# Patient Record
Sex: MALE | Race: WHITE | Employment: FULL TIME | URBAN - METROPOLITAN AREA
[De-identification: names, ages, dates, MRNs, and addresses within clinical notes are randomized per-mention and may not be internally consistent; named-entity substitution may affect disease eponyms.]

---

## 2017-01-17 ENCOUNTER — OFFICE VISIT (OUTPATIENT)
Dept: FAMILY MEDICINE | Facility: CLINIC | Age: 37
End: 2017-01-17

## 2017-01-17 VITALS
TEMPERATURE: 98.3 F | RESPIRATION RATE: 16 BRPM | HEART RATE: 57 BPM | SYSTOLIC BLOOD PRESSURE: 100 MMHG | HEIGHT: 69 IN | BODY MASS INDEX: 30.6 KG/M2 | OXYGEN SATURATION: 98 % | DIASTOLIC BLOOD PRESSURE: 54 MMHG | WEIGHT: 206.6 LBS

## 2017-01-17 DIAGNOSIS — E66.09 OBESITY DUE TO EXCESS CALORIES, UNSPECIFIED OBESITY SEVERITY: ICD-10-CM

## 2017-01-17 DIAGNOSIS — F41.1 GAD (GENERALIZED ANXIETY DISORDER): Primary | ICD-10-CM

## 2017-01-17 DIAGNOSIS — G47.00 PERSISTENT INSOMNIA: ICD-10-CM

## 2017-01-17 PROCEDURE — 99214 OFFICE O/P EST MOD 30 MIN: CPT | Performed by: FAMILY MEDICINE

## 2017-01-17 RX ORDER — PAROXETINE 20 MG/1
20 TABLET, FILM COATED ORAL DAILY
Qty: 90 TABLET | Refills: 3 | Status: SHIPPED | OUTPATIENT
Start: 2017-01-17 | End: 2017-12-14

## 2017-01-17 RX ORDER — TRAZODONE HYDROCHLORIDE 50 MG/1
50 TABLET, FILM COATED ORAL AT BEDTIME
Qty: 90 TABLET | Refills: 3 | Status: SHIPPED | OUTPATIENT
Start: 2017-01-17 | End: 2017-12-14

## 2017-01-17 ASSESSMENT — ANXIETY QUESTIONNAIRES
5. BEING SO RESTLESS THAT IT IS HARD TO SIT STILL: NOT AT ALL
GAD7 TOTAL SCORE: 5
6. BECOMING EASILY ANNOYED OR IRRITABLE: SEVERAL DAYS
IF YOU CHECKED OFF ANY PROBLEMS ON THIS QUESTIONNAIRE, HOW DIFFICULT HAVE THESE PROBLEMS MADE IT FOR YOU TO DO YOUR WORK, TAKE CARE OF THINGS AT HOME, OR GET ALONG WITH OTHER PEOPLE: NOT DIFFICULT AT ALL
1. FEELING NERVOUS, ANXIOUS, OR ON EDGE: SEVERAL DAYS
2. NOT BEING ABLE TO STOP OR CONTROL WORRYING: SEVERAL DAYS
3. WORRYING TOO MUCH ABOUT DIFFERENT THINGS: SEVERAL DAYS
7. FEELING AFRAID AS IF SOMETHING AWFUL MIGHT HAPPEN: NOT AT ALL

## 2017-01-17 ASSESSMENT — PATIENT HEALTH QUESTIONNAIRE - PHQ9: 5. POOR APPETITE OR OVEREATING: SEVERAL DAYS

## 2017-01-17 NOTE — MR AVS SNAPSHOT
After Visit Summary   1/17/2017    Rakesh Marie    MRN: [id number]           Patient Information     Date Of Birth          1980        Visit Information        Provider Department      1/17/2017 4:30 PM Cyn Briones MD [facility]        Today's Diagnoses     JORGE L (generalized anxiety disorder)    -  1     Persistent insomnia         Obesity due to excess calories, unspecified obesity severity           Care Instructions    I've explained to him that drugs of the SSRI class can have side effects such as weight gain, sexual dysfunction, insomnia, headache, nausea. These medications are generally effective at alleviating symptoms of anxiety and/or depression. Let me know if significant side effects do occur.  Refilled one year    A low fat diet, regular aerobic exercise like walking 30 minutes daily and weight control is the treatment recommendations at this time          Follow-ups after your visit        Who to contact     If you have questions or need follow up information about today's clinic visit or your schedule please contact [facility] directly at [phone].  Normal or non-critical lab and imaging results will be communicated to you by Hingehart, letter or phone within 4 business days after the clinic has received the results. If you do not hear from us within 7 days, please contact the clinic through [employer] or phone. If you have a critical or abnormal lab result, we will notify you by phone as soon as possible.  Submit refill requests through [employer] or call your pharmacy and they will forward the refill request to us. Please allow 3 business days for your refill to be completed.          Additional Information About Your Visit        [email or web address] Information     [employer] lets you send messages to your doctor, view your test results, renew your prescriptions, schedule appointments and more. To sign up, go to [email or web address] . Click on  "\"Log in\" on the left side of the screen, which will take you to the Welcome page. Then click on \"Sign up Now\" on the right side of the page.     You will be asked to enter the access code listed below, as well as some personal information. Please follow the directions to create your username and password.     Your access code is: [email or web address]  Expires: 2017  5:21 PM     Your access code will  in 90 days. If you need help or a new code, please call your [place] clinic or [phone].        Care EveryWhere ID     This is your Care EveryWhere ID. This could be used by other organizations to access your [place] medical records  [id number]        Your Vitals Were     Pulse Temperature Height BMI (Body Mass Index) Pulse Oximetry       57 98.3  F (36.8  C) (Oral) 1.74 m (5' 8.5\") 30.95 kg/m2 98%        Blood Pressure from Last 3 Encounters:   17 100/54   16 110/70   03/24/15 118/70    Weight from Last 3 Encounters:   17 93.713 kg (206 lb 9.6 oz)   16 93.895 kg (207 lb)   03/24/15 96.707 kg (213 lb 3.2 oz)              Today, you had the following     No orders found for display         Where to get your medicines      These medications were sent to [facility] [address] [place], [place] [id number]     Phone:  [phone]    - PARoxetine 20 MG tablet  - traZODone 50 MG tablet       Primary Care Provider Office Phone # Fax #    Cyn Briones [phone]       [facility] [address] [address]        Thank you!     Thank you for choosing [facility]  for your care. Our goal is always to provide you with excellent care. Hearing back from our patients is one way we can continue to improve our services. Please take a few minutes to complete the written survey that you may receive in the mail after your visit with us. Thank you!             Your Updated Medication " List - Protect others around you: Learn how to safely use, store and throw away your medicines at www.disposemymeds.org.          This list is accurate as of: 1/17/17  5:21 PM.  Always use your most recent med list.                   Brand Name Dispense Instructions for use    PARoxetine 20 MG tablet    PAXIL    90 tablet    Take 1 tablet (20 mg) by mouth daily       traZODone 50 MG tablet    DESYREL    90 tablet    Take 1 tablet (50 mg) by mouth At Bedtime at bedtime.

## 2017-01-17 NOTE — NURSING NOTE
Rakesh is here for med check    Pre-Visit Screening :  Immunizations : up to date  Colon Screening : NA  Asthma Action Test/Plan : NA  PHQ9/GAD7 :  Done today  BP done on the right arm, with a lg sized cuff.  Pulse - regular  My Chart - declines    CLASSIFICATION OF OVERWEIGHT AND OBESITY BY BMI                         Obesity Class           BMI(kg/m2)  Underweight                                    < 18.5  Normal                                         18.5-24.9  Overweight                                     25.0-29.9  OBESITY                     I                  30.0-34.9                              II                 35.0-39.9  EXTREME OBESITY             III                >40                             Patient's  BMI Body mass index is 30.95 kg/(m^2).  http://hin.nhlbi.nih.gov/menuplanner/menu.cgi  Questioned patient about current smoking habits.  Pt. quit smoking some time ago.

## 2017-01-17 NOTE — PROGRESS NOTES
"SUBJECTIVE:  Rakesh Marie is an 36 year old male who presents for follow-up   of anxiety with panic attacks and mild depressive symptoms.  Initially evaluated age 19 with a panic attack in the ER.  Notes from that visit reviewed.  Current symptoms include   none. Symptoms that have subjectively improved include depressed mood, insomnia, psychomotor agitation (restless and /or feeling on edge), feelings of worthlessness, feelings of guilt, difficulty with concentration, anxiety, irritablility, panic attacks, sleep disturbance, difficulty falling asleep.  Previous and current treatment modalities   employed include individual therapy and medication(s) Paxil (paroxetine) and Trazodone.     Organic causes of depression present: strong family history    Current Outpatient Prescriptions   Medication     traZODone (DESYREL) 50 MG tablet     PARoxetine (PAXIL) 20 MG tablet     [DISCONTINUED] traZODone (DESYREL) 50 MG tablet     [DISCONTINUED] PARoxetine (PAXIL) 20 MG tablet     No current facility-administered medications for this visit.     Allergies   Allergen Reactions     Amoxicillin Diarrhea     Side effects of medication: none    Social History   Substance Use Topics     Smoking status: Former Smoker -- 0.50 packs/day for 17 years     Types: Cigarettes     Quit date: 03/01/2012     Smokeless tobacco: Never Used     Alcohol Use: 3.0 oz/week     6 drink(s) per week      Comment: 6 drinks per week         Neurologic: negative  Psychiatric: excessive stress-work deadlines/ family at home  Endocrine: negative    OBJECTIVE:  /54 mmHg  Pulse 57  Temp(Src) 98.3  F (36.8  C) (Oral)  Ht 1.74 m (5' 8.5\")  Wt 93.713 kg (206 lb 9.6 oz)  BMI 30.95 kg/m2  SpO2 98%  Mental Status Examination  Posture and motor behavior: negative  Dress, grooming, personal hygiene: negative  Facial expression: negative  Speech: negative  Mood: negative  Coherency and relevance of thought: negative  Thought content: negative  Perceptions: " negative  Orientation: negative  Attention and concentration: negative  Memory: : negative  Information: negative  Vocabulary: negative  Abstract reasoning: negative  Judgment: negative    General appearance: healthy, alert, no distress, cooperative, smiling and over weight  Eyes: conjunctivae/corneas clear. PERRL, EOM's intact. Fundi benign  Ears: negative  Oropharynx: Lips, mucosa, and tongue normal. Teeth and gums normal.  Neck: Neck supple. No adenopathy. Thyroid symmetric, normal size,, Carotids without bruits.  Lungs: negative, Percussion normal. Good diaphragmatic excursion. Lungs clear  Heart: negative, PMI normal. No lifts, heaves, or thrills. RRR. No murmurs, clicks gallops or rub  Abdomen: Abdomen soft, non-tender. BS normal. No masses, organomegaly  Neuro: Gait normal. Reflexes normal and symmetric. Sensation grossly WNL.  BMI : Body mass index is 30.95 kg/(m^2).    ASSESSMENT:(F41.1) JORGE L (generalized anxiety disorder)  (primary encounter diagnosis)  Comment: I've explained to him that drugs of the SSRI class can have side effects such as weight gain, sexual dysfunction, insomnia, headache, nausea. These medications are generally effective at alleviating symptoms of anxiety and/or depression. Let me know if significant side effects do occur.    Plan: traZODone (DESYREL) 50 MG tablet, PARoxetine         (PAXIL) 20 MG tablet        Potential medication side effects were discussed with the patient; let me know if any occur.  Refilled one year.    (G47.00) Persistent insomnia  Plan: traZODone (DESYREL) 50 MG tablet        Potential medication side effects were discussed with the patient; let me know if any occur.      (E66.09) Obesity due to excess calories, unspecified obesity severity  Comment: options for strews reduction  Plan: A low fat diet, regular aerobic exercise like walking 30 minutes daily and weight control is the treatment recommendations at this time

## 2017-01-17 NOTE — PATIENT INSTRUCTIONS
I've explained to him that drugs of the SSRI class can have side effects such as weight gain, sexual dysfunction, insomnia, headache, nausea. These medications are generally effective at alleviating symptoms of anxiety and/or depression. Let me know if significant side effects do occur.  Refilled one year    A low fat diet, regular aerobic exercise like walking 30 minutes daily and weight control is the treatment recommendations at this time

## 2017-01-18 ASSESSMENT — ANXIETY QUESTIONNAIRES: GAD7 TOTAL SCORE: 5

## 2017-01-18 ASSESSMENT — PATIENT HEALTH QUESTIONNAIRE - PHQ9: SUM OF ALL RESPONSES TO PHQ QUESTIONS 1-9: 2

## 2017-12-14 ENCOUNTER — OFFICE VISIT (OUTPATIENT)
Dept: FAMILY MEDICINE | Facility: CLINIC | Age: 37
End: 2017-12-14

## 2017-12-14 VITALS
TEMPERATURE: 98 F | RESPIRATION RATE: 16 BRPM | HEART RATE: 80 BPM | BODY MASS INDEX: 31.81 KG/M2 | HEIGHT: 69 IN | OXYGEN SATURATION: 96 % | SYSTOLIC BLOOD PRESSURE: 116 MMHG | DIASTOLIC BLOOD PRESSURE: 60 MMHG | WEIGHT: 214.8 LBS

## 2017-12-14 DIAGNOSIS — G47.00 PERSISTENT INSOMNIA: ICD-10-CM

## 2017-12-14 DIAGNOSIS — F41.1 GAD (GENERALIZED ANXIETY DISORDER): Primary | ICD-10-CM

## 2017-12-14 PROCEDURE — 99214 OFFICE O/P EST MOD 30 MIN: CPT | Performed by: FAMILY MEDICINE

## 2017-12-14 RX ORDER — PAROXETINE 20 MG/1
20 TABLET, FILM COATED ORAL DAILY
Qty: 90 TABLET | Refills: 3 | Status: SHIPPED | OUTPATIENT
Start: 2017-12-14

## 2017-12-14 RX ORDER — TRAZODONE HYDROCHLORIDE 50 MG/1
50 TABLET, FILM COATED ORAL AT BEDTIME
Qty: 90 TABLET | Refills: 3 | Status: SHIPPED | OUTPATIENT
Start: 2017-12-14

## 2017-12-14 ASSESSMENT — PATIENT HEALTH QUESTIONNAIRE - PHQ9
5. POOR APPETITE OR OVEREATING: NOT AT ALL
SUM OF ALL RESPONSES TO PHQ QUESTIONS 1-9: 5

## 2017-12-14 ASSESSMENT — ANXIETY QUESTIONNAIRES
5. BEING SO RESTLESS THAT IT IS HARD TO SIT STILL: NOT AT ALL
2. NOT BEING ABLE TO STOP OR CONTROL WORRYING: SEVERAL DAYS
IF YOU CHECKED OFF ANY PROBLEMS ON THIS QUESTIONNAIRE, HOW DIFFICULT HAVE THESE PROBLEMS MADE IT FOR YOU TO DO YOUR WORK, TAKE CARE OF THINGS AT HOME, OR GET ALONG WITH OTHER PEOPLE: NOT DIFFICULT AT ALL
GAD7 TOTAL SCORE: 2
3. WORRYING TOO MUCH ABOUT DIFFERENT THINGS: NOT AT ALL
6. BECOMING EASILY ANNOYED OR IRRITABLE: NOT AT ALL
7. FEELING AFRAID AS IF SOMETHING AWFUL MIGHT HAPPEN: NOT AT ALL
1. FEELING NERVOUS, ANXIOUS, OR ON EDGE: SEVERAL DAYS

## 2017-12-14 NOTE — PATIENT INSTRUCTIONS
JORGE L (generalized anxiety disorder)  (primary encounter diagnosis)  Plan: traZODone (DESYREL) 50 MG tablet, PARoxetine         (PAXIL) 20 MG tablet        I've explained to him that drugs of the SSRI class can have side effects such as weight gain, sexual dysfunction, insomnia, headache, nausea. These medications are generally effective at alleviating symptoms of anxiety and/or depression. Let me know if significant side effects do occur.

## 2017-12-14 NOTE — NURSING NOTE
Rakesh is here for a medication recheck.     Pre-Visit Screening :  Immunizations : up to date  Colon Screening : NA  Asthma Action Test/Plan : NA  PHQ9/GAD7 :  Done today    Pulse - regular  My Chart - accepts    CLASSIFICATION OF OVERWEIGHT AND OBESITY BY BMI                         Obesity Class           BMI(kg/m2)  Underweight                                    < 18.5  Normal                                         18.5-24.9  Overweight                                     25.0-29.9  OBESITY                     I                  30.0-34.9                              II                 35.0-39.9  EXTREME OBESITY             III                >40                             Patient's  BMI Body mass index is 32.19 kg/(m^2).  http://hin.nhlbi.nih.gov/menuplanner/menu.cgi  Questioned patient about current smoking habits.  Pt. quit smoking some time ago.    The patient has verbalized that it is ok to leave a detailed voice message on the patient's cell phone with results/recommendations from this visit.       Verified cell phone number: *[phone]**    Regla.RANDY Zuleta ([facility])

## 2017-12-14 NOTE — PROGRESS NOTES
"SUBJECTIVE:  Rakesh Marie is an 37 year old male who presents for follow-up of anxiety with panic attacks and mild depressive symptoms.  Initially evaluated age 19 in the ER.  Notes from that visit reviewed.  Current symptoms include   irritablility. Symptoms that have subjectively improved include depressed mood, insomnia, psychomotor agitation (restless and /or feeling on edge), difficulty with concentration, anxiety, irritablility, panic attacks, sleep disturbance, difficulty falling asleep.  Previous and current treatment modalities   employed include individual therapy and medication(s) Paxil (paroxetine) and Trazodone.     Organic causes of depression present: strong family history    Current Outpatient Prescriptions   Medication     traZODone (DESYREL) 50 MG tablet     PARoxetine (PAXIL) 20 MG tablet     No current facility-administered medications for this visit.      Allergies   Allergen Reactions     Amoxicillin Diarrhea     Tree Nuts [Nuts] Rash     Side effects of medication: sometimes sleepy after trazodone/none    Social History   Substance Use Topics     Smoking status: Former Smoker     Packs/day: 0.50     Years: 17.00     Types: Cigarettes     Quit date: 3/1/2012     Smokeless tobacco: Never Used     Alcohol use 3.0 oz/week     6 drink(s) per week      Comment: 6 drinks per week         Neurologic: negative  Psychiatric: excessive stress-new job supervising  Endocrine: negative    OBJECTIVE:  /60 (BP Location: Right arm, Patient Position: Chair, Cuff Size: Adult Large)  Pulse 80  Temp 98  F (36.7  C) (Oral)  Ht 1.74 m (5' 8.5\")  Wt 97.4 kg (214 lb 12.8 oz)  SpO2 96%  BMI 32.19 kg/m2  Mental Status Examination  Posture and motor behavior: negative  Dress, grooming, personal hygiene: negative  Facial expression: negative  Speech: negative  Mood: negative  Coherency and relevance of thought: negative  Thought content: negative  Perceptions: negative  Orientation: negative  Attention and " concentration: negative  Memory: : negative  Information: negative  Vocabulary: negative  Abstract reasoning: negative  Judgment: negative    General appearance: healthy, alert, no distress, cooperative and smiling  Eyes: conjunctivae/corneas clear. PERRL, EOM's intact. Fundi benign  Ears: negative  Oropharynx: Lips, mucosa, and tongue normal. Teeth and gums normal.  Neck: Neck supple. No adenopathy. Thyroid symmetric, normal size,, Carotids without bruits.  Lungs: negative, Percussion normal. Good diaphragmatic excursion. Lungs clear  Heart: negative, PMI normal. No lifts, heaves, or thrills. RRR. No murmurs, clicks gallops or rub  Abdomen: Abdomen soft, non-tender. BS normal. No masses, organomegaly, positive findings: obese  Neuro: Gait normal. Reflexes normal and symmetric. Sensation grossly WNL.    ASSESSMENT:(F41.1) JORGE L (generalized anxiety disorder)  (primary encounter diagnosis)  Plan: traZODone (DESYREL) 50 MG tablet, PARoxetine         (PAXIL) 20 MG tablet        I've explained to him that drugs of the SSRI class can have side effects such as weight gain, sexual dysfunction, insomnia, headache, nausea. These medications are generally effective at alleviating symptoms of anxiety and/or depression. Let me know if significant side effects do occur.  Recheck 1 year    (G47.00) Persistent insomnia  Plan: traZODone (DESYREL) 50 MG tablet        Potential medication side effects were discussed with the patient; let me know if any occur.

## 2017-12-14 NOTE — MR AVS SNAPSHOT
"              After Visit Summary   12/14/2017    Rakesh Marie    MRN: [id number]           Patient Information     Date Of Birth          1980        Visit Information        Provider Department      12/14/2017 1:00 PM Cyn Briones MD [facility]        Today's Diagnoses     JORGE L (generalized anxiety disorder)    -  1    Persistent insomnia          Care Instructions    JORGE L (generalized anxiety disorder)  (primary encounter diagnosis)  Plan: traZODone (DESYREL) 50 MG tablet, PARoxetine         (PAXIL) 20 MG tablet        I've explained to him that drugs of the SSRI class can have side effects such as weight gain, sexual dysfunction, insomnia, headache, nausea. These medications are generally effective at alleviating symptoms of anxiety and/or depression. Let me know if significant side effects do occur.            Follow-ups after your visit        Who to contact     If you have questions or need follow up information about today's clinic visit or your schedule please contact [facility] directly at [phone].  Normal or non-critical lab and imaging results will be communicated to you by netomathart, letter or phone within 4 business days after the clinic has received the results. If you do not hear from us within 7 days, please contact the clinic through Xillian[employer] or phone. If you have a critical or abnormal lab result, we will notify you by phone as soon as possible.  Submit refill requests through [employer] or call your pharmacy and they will forward the refill request to us. Please allow 3 business days for your refill to be completed.          Additional Information About Your Visit        [employer] Information     [employer] lets you send messages to your doctor, view your test results, renew your prescriptions, schedule appointments and more. To sign up, go to [email or web address] . Click on \"Log in\" on the left side of the screen, which will take you to the " "Welcome page. Then click on \"Sign up Now\" on the right side of the page.     You will be asked to enter the access code listed below, as well as some personal information. Please follow the directions to create your username and password.     Your access code is: [email or web address]  Expires: 3/14/2018  2:51 PM     Your access code will  in 90 days. If you need help or a new code, please call your [place] clinic or [phone].        Care EveryWhere ID     This is your Care EveryWhere ID. This could be used by other organizations to access your [facility] medical records  [id number]        Your Vitals Were     Pulse Temperature Height Pulse Oximetry BMI (Body Mass Index)       80 98  F (36.7  C) (Oral) 1.74 m (5' 8.5\") 96% 32.19 kg/m2        Blood Pressure from Last 3 Encounters:   17 116/60   17 100/54   16 110/70    Weight from Last 3 Encounters:   17 97.4 kg (214 lb 12.8 oz)   17 93.7 kg (206 lb 9.6 oz)   16 93.9 kg (207 lb)              Today, you had the following     No orders found for display         Where to get your medicines      These medications were sent to [facility] [address] [place] [address] [employer]  AT [place] [address] [facility] [address]     Phone:  [phone]     PARoxetine 20 MG tablet    traZODone 50 MG tablet          Primary Care Provider Office Phone # Fax #    Cyn Briones [phone]       [facility] [address] [address]        Equal Access to Services     [facility] VIRGEN : Angela Robb, washreyada luqadaha, qaybta kaalmada katia, ankush hammonds. [facility] [phone].    ATENCIÓN: Si habla español, tiene a taveras disposición servicios gratuitos de asistencia lingüística. Llame al [phone].    We comply with applicable federal civil rights laws and [place] laws. We do not discriminate on the basis of race, color, national " origin, age, disability, sex, sexual orientation, or gender identity.            Thank you!     Thank you for choosing [facility]  for your care. Our goal is always to provide you with excellent care. Hearing back from our patients is one way we can continue to improve our services. Please take a few minutes to complete the written survey that you may receive in the mail after your visit with us. Thank you!             Your Updated Medication List - Protect others around you: Learn how to safely use, store and throw away your medicines at www.disposemymeds.org.          This list is accurate as of: 12/14/17  2:51 PM.  Always use your most recent med list.                   Brand Name Dispense Instructions for use Diagnosis    PARoxetine 20 MG tablet    PAXIL    90 tablet    Take 1 tablet (20 mg) by mouth daily    JORGE L (generalized anxiety disorder)       traZODone 50 MG tablet    DESYREL    90 tablet    Take 1 tablet (50 mg) by mouth At Bedtime at bedtime.    Persistent insomnia, JORGE L (generalized anxiety disorder)

## 2017-12-15 ASSESSMENT — ANXIETY QUESTIONNAIRES: GAD7 TOTAL SCORE: 2

## 2022-01-03 ENCOUNTER — OFFICE VISIT (OUTPATIENT)
Dept: FAMILY MEDICINE | Facility: CLINIC | Age: 42
End: 2022-01-03

## 2022-01-03 VITALS
HEART RATE: 74 BPM | DIASTOLIC BLOOD PRESSURE: 74 MMHG | RESPIRATION RATE: 16 BRPM | OXYGEN SATURATION: 98 % | SYSTOLIC BLOOD PRESSURE: 118 MMHG | TEMPERATURE: 98.4 F | BODY MASS INDEX: 32.96 KG/M2 | WEIGHT: 220 LBS

## 2022-01-03 DIAGNOSIS — R05.9 COUGH: Primary | ICD-10-CM

## 2022-01-03 DIAGNOSIS — J06.9 UPPER RESPIRATORY TRACT INFECTION, UNSPECIFIED TYPE: ICD-10-CM

## 2022-01-03 LAB
FLUAV AG SPEC QL IA: NEGATIVE
FLUBV AG SPEC QL IA: NEGATIVE

## 2022-01-03 PROCEDURE — U0005 INFEC AGEN DETEC AMPLI PROBE: HCPCS | Performed by: PHYSICIAN ASSISTANT

## 2022-01-03 PROCEDURE — U0003 INFECTIOUS AGENT DETECTION BY NUCLEIC ACID (DNA OR RNA); SEVERE ACUTE RESPIRATORY SYNDROME CORONAVIRUS 2 (SARS-COV-2) (CORONAVIRUS DISEASE [COVID-19]), AMPLIFIED PROBE TECHNIQUE, MAKING USE OF HIGH THROUGHPUT TECHNOLOGIES AS DESCRIBED BY CMS-2020-01-R: HCPCS | Performed by: PHYSICIAN ASSISTANT

## 2022-01-03 PROCEDURE — 99203 OFFICE O/P NEW LOW 30 MIN: CPT | Performed by: PHYSICIAN ASSISTANT

## 2022-01-03 PROCEDURE — 87804 INFLUENZA ASSAY W/OPTIC: CPT | Performed by: PHYSICIAN ASSISTANT

## 2022-01-03 NOTE — PATIENT INSTRUCTIONS
Patient Education     For Patients Who Have Been Tested for COVID-19 (Coronavirus)  You have been tested for COVID-19 (coronavirus). Results are typically available in 1 to 3 days. Our testing sites do not have access to your test results.  If you have not received your results in 5 days, please do the following:    If you are being tested before surgery: Call your surgeon's office or call [phone] ([phone]) and ask to speak with our COVID-19 results team.    If you are being treated at an infusion center: Call your infusion center directly.    All others: Call [phone] ([phone]) and ask to speak with our COVID-19 results team.  What you should do if you have COVID-19 symptoms  Please stay home and away from others (self-isolate) until:    You've had no fever--and no medicine that reduces fever--for 3 full days (72 hours), AND    Your other symptoms have gotten better. For example, your cough or breathing has improved, AND    At least 7 days have passed since your symptoms first started.  During this time:    Don't go to work, school or anywhere else.    Stay away from others in your home. No hugging, kissing or shaking hands.    Don't let anyone visit.    Cover your mouth and nose with a mask, tissue or washcloth to avoid spreading germs.    Wash your hands and face often. Use soap and water.  When to call for 911 for medical help  If you have any of these emergency warning signs for COVID-19, call for medical help right away:    Trouble breathing    Pain or pressure in the chest all the time    Blue color to your lips or face  These are not all the symptoms you can have with COVID-19. Call your health provider for any other symptoms that are severe or concerning to you.  When you call 911, tell the  that you have -- or think you might have--COVID-19.   Where can I get more information?  To learn more about COVID-19 and how to care for yourself at home, please visit the CDC website  at https://www.cdc.gov/coronavirus/2019-ncov/about/steps-when-sick.html  For more about your care at [facility], please visit [email or web address]  For informational purposes only. Not to replace the advice of your health care provider.   Clinically reviewed by Infection Prevention and the [facility] COVID-19 Clinical Team.  Copyright   2020 [facility]. All rights reserved. Co.Import 907741 - 05/20.

## 2022-01-03 NOTE — PROGRESS NOTES
Assessment & Plan     Cough  Patient was seen with constellation of upper respiratory symptoms cough muscle aches fatigue and fever.  His symptoms have been present only 2 days thus will check for influenza and COVID-19 PCR today.  He will be notified of the results.  Encouraged him to continue isolation until results return.  Discussed conservative measures including fluids rest ibuprofen or Tylenol and indications to return for severe symptoms.      - Symptomatic; Yes; 1/1/2022 COVID-19 Virus (Coronavirus) by PCR Nose  - Influenza A & B Antigen - Clinic Collect    Upper respiratory tract infection, unspecified type  - Influenza A & B Antigen - Clinic Collect    Nolvia Sanchez PA-C  [facility]    Mira Cameron is a 41 year old male who presents to clinic today for the following health issues:  Chief Complaint   Patient presents with     Covid Concern     started with headache fever cough bodyaches loss of tast and smell  on 1/1     HPI  2 day hx patient has had a 2-day history of rhinorrhea nasal congestion and cough.  He has had muscle aches and fatigue as well as loss of taste and smell.  He denies any known exposures to COVID-19.  He has had COVID-19 J & J vaccine and a booster of Moderna .  He has not had influenza vaccine.  He denies any chest pain shortness of breath or difficulty breathing.  He works in the community.    Review of Systems  Constitutional, HEENT, cardiovascular, pulmonary, gi and gu systems are negative, except as otherwise noted.      Objective    /74   Pulse 74   Temp 98.4  F (36.9  C)   Resp 16   Wt 99.8 kg (220 lb)   SpO2 98%   BMI 32.96 kg/m    Physical Exam   Pt is in no acute distress and appears well  Ears patent B:  TM s intact, non-injected. All land marks easily visibile    Nasal mucosa is non-edematous, no discharge.    Pharynx: non erythematous, tonsils non hypertrophied, No exudate   Neck supple: no adenopathy  Lungs: CTA  Heart:  RRR, no murmur, no thrills or heaves   Ext: no edema  Skin: no rashes

## 2022-01-04 LAB — SARS-COV-2 RNA RESP QL NAA+PROBE: POSITIVE

## 2022-01-09 ENCOUNTER — HEALTH MAINTENANCE LETTER (OUTPATIENT)
Age: 42
End: 2022-01-09

## 2022-12-25 ENCOUNTER — HEALTH MAINTENANCE LETTER (OUTPATIENT)
Age: 42
End: 2022-12-25

## 2023-04-16 ENCOUNTER — HEALTH MAINTENANCE LETTER (OUTPATIENT)
Age: 43
End: 2023-04-16

## 2024-06-23 ENCOUNTER — HEALTH MAINTENANCE LETTER (OUTPATIENT)
Age: 44
End: 2024-06-23